# Patient Record
Sex: FEMALE | Race: WHITE | NOT HISPANIC OR LATINO | Employment: OTHER | URBAN - METROPOLITAN AREA
[De-identification: names, ages, dates, MRNs, and addresses within clinical notes are randomized per-mention and may not be internally consistent; named-entity substitution may affect disease eponyms.]

---

## 2017-05-17 ENCOUNTER — IMPORTED ENCOUNTER (OUTPATIENT)
Dept: URBAN - METROPOLITAN AREA CLINIC 9 | Facility: CLINIC | Age: 72
End: 2017-05-17

## 2017-11-15 ENCOUNTER — IMPORTED ENCOUNTER (OUTPATIENT)
Dept: URBAN - METROPOLITAN AREA CLINIC 9 | Facility: CLINIC | Age: 72
End: 2017-11-15

## 2018-05-16 ENCOUNTER — IMPORTED ENCOUNTER (OUTPATIENT)
Dept: URBAN - METROPOLITAN AREA CLINIC 9 | Facility: CLINIC | Age: 73
End: 2018-05-16

## 2018-11-15 ENCOUNTER — IMPORTED ENCOUNTER (OUTPATIENT)
Dept: URBAN - METROPOLITAN AREA CLINIC 9 | Facility: CLINIC | Age: 73
End: 2018-11-15

## 2018-12-19 ENCOUNTER — IMPORTED ENCOUNTER (OUTPATIENT)
Dept: URBAN - METROPOLITAN AREA CLINIC 9 | Facility: CLINIC | Age: 73
End: 2018-12-19

## 2019-01-16 ENCOUNTER — IMPORTED ENCOUNTER (OUTPATIENT)
Dept: URBAN - METROPOLITAN AREA CLINIC 9 | Facility: CLINIC | Age: 74
End: 2019-01-16

## 2019-02-13 ENCOUNTER — IMPORTED ENCOUNTER (OUTPATIENT)
Dept: URBAN - METROPOLITAN AREA CLINIC 9 | Facility: CLINIC | Age: 74
End: 2019-02-13

## 2019-05-22 ENCOUNTER — IMPORTED ENCOUNTER (OUTPATIENT)
Dept: URBAN - METROPOLITAN AREA CLINIC 9 | Facility: CLINIC | Age: 74
End: 2019-05-22

## 2019-11-14 ENCOUNTER — IMPORTED ENCOUNTER (OUTPATIENT)
Dept: URBAN - METROPOLITAN AREA CLINIC 9 | Facility: CLINIC | Age: 74
End: 2019-11-14

## 2020-02-26 NOTE — PATIENT DISCUSSION
Discussed option of trying EZ Tears supplements. Handout given and supplement discussed. Advised if would like to try EZ Tears that Pt should avoid doubling up on supplements in EZ Tears. Advised takes about 3-4 weeks before Pt will notice improvement.

## 2020-02-26 NOTE — PATIENT DISCUSSION
Pt reports has tried Restasis w/out improvement. Pt states tried Prednisolone and it improved symptoms, but advised not a long term solution.

## 2020-02-26 NOTE — PATIENT DISCUSSION
Continue Systane Gel drops and increase prn. Recommended Gel in a tube qhs. Systane PM Gel coupon given today.

## 2020-05-13 ENCOUNTER — IMPORTED ENCOUNTER (OUTPATIENT)
Dept: URBAN - METROPOLITAN AREA CLINIC 9 | Facility: CLINIC | Age: 75
End: 2020-05-13

## 2020-06-04 NOTE — PATIENT DISCUSSION
Start Erythromycin bartolo qhs OU for [address] of PM Gel. Advised will cause blurred vision. After 1 week can change back to PM Gel.

## 2020-11-18 ENCOUNTER — IMPORTED ENCOUNTER (OUTPATIENT)
Dept: URBAN - METROPOLITAN AREA CLINIC 9 | Facility: CLINIC | Age: 75
End: 2020-11-18

## 2020-12-08 NOTE — PATIENT DISCUSSION
Pt reports has tried Restasis in past w/out improvement. Pt states tried Prednisolone and it improved symptoms.

## 2020-12-08 NOTE — PATIENT DISCUSSION
Pt reports using FML really improves symptoms and prefers to continue to use prn, but having trouble with insurance coverage.

## 2020-12-08 NOTE — PATIENT DISCUSSION
Okay to Continue FML bid OU prn. Pt aware to use very sparingly prn. Will attempt insurance PA for Pt.

## 2020-12-08 NOTE — PATIENT DISCUSSION
"Pt reports using ATs throughout the day, provides minimal improvement, ""never really goes away with just artificial tears"". "

## 2021-05-05 ENCOUNTER — IMPORTED ENCOUNTER (OUTPATIENT)
Dept: URBAN - METROPOLITAN AREA CLINIC 9 | Facility: CLINIC | Age: 76
End: 2021-05-05

## 2021-08-05 ENCOUNTER — IMPORTED ENCOUNTER (OUTPATIENT)
Dept: URBAN - METROPOLITAN AREA CLINIC 9 | Facility: CLINIC | Age: 76
End: 2021-08-05

## 2021-10-17 ASSESSMENT — VISUAL ACUITY
OS_CC: 20/30 -2 SN
OD_SC: 20/25 -2 SN
OS_SC: 20/30 -2 SN
OS_SC: 20/40 SN
OS_SC: 20/60 SN
OD_SC: 20/30 SN
OD_SC: 20/25 -2 SN
OS_SC: 20/30 -2 SN
OS_PH: 20/40 + SN
OD_SC: 20/25 - SN
OD_SC: 20/25 -2 SN
OS_SC: 20/30 - SN
OS_CC: 20/30 +2 SN
OS_SC: 20/60 - SN
OS_SC: 20/60 SN
OD_CC: 20/25 + SN
OS_PH: 20/40 SN
OD_SC: 20/20 SN
OS_PH: 20/50 SN
OS_SC: 20/50 - SN
OD_CC: 20/25 SN
OS_PH: 20/30 -2 SN
OS_SC: 20/60 - SN
OS_SC: 20/30 +2 SN
OD_SC: 20/20 - SN
OS_SC: 20/40 SN
OD_SC: 20/25 SN
OD_SC: 20/25 SN
OS_SC: 20/50 -2 SN
OD_CC: 20/20 - SN
OD_SC: 20/25 -2 SN
OD_CC: 20/25 SN
OS_CC: 20/40 - SN
OD_SC: 20/25 SN
OD_SC: 20/30 SN
OD_SC: 20/30 - SN
OS_SC: 20/30 -2 SN
OS_CC: 20/30 -2 SN

## 2021-10-17 ASSESSMENT — TONOMETRY
OD_IOP_MMHG: 19
OS_IOP_MMHG: 19
OD_IOP_MMHG: 19
OS_IOP_MMHG: 17
OS_IOP_MMHG: 12
OD_IOP_MMHG: 17
OS_IOP_MMHG: 18
OD_IOP_MMHG: 32
OD_IOP_MMHG: 12
OD_IOP_MMHG: 22
OS_IOP_MMHG: 19
OD_IOP_MMHG: 14
OS_IOP_MMHG: 19
OS_IOP_MMHG: 19
OD_IOP_MMHG: 17
OD_IOP_MMHG: 19
OS_IOP_MMHG: 18
OD_IOP_MMHG: 17
OS_IOP_MMHG: 18
OS_IOP_MMHG: 12
OD_IOP_MMHG: 20
OS_IOP_MMHG: 20
OS_IOP_MMHG: 16
OS_IOP_MMHG: 13
OD_IOP_MMHG: 13
OD_IOP_MMHG: 19

## 2021-10-17 ASSESSMENT — KERATOMETRY
OS_AXISANGLE2_DEGREES: 20
OD_AXISANGLE2_DEGREES: 132
OD_AXISANGLE_DEGREES: 180
OS_K1POWER_DIOPTERS: 46.25
OD_AXISANGLE_DEGREES: 42
OD_AXISANGLE_DEGREES: 24
OD_AXISANGLE2_DEGREES: 114
OS_K1POWER_DIOPTERS: 46
OS_K1POWER_DIOPTERS: 46.25
OD_AXISANGLE2_DEGREES: 90
OS_K2POWER_DIOPTERS: 46.5
OD_K1POWER_DIOPTERS: 46
OS_AXISANGLE_DEGREES: 177
OD_AXISANGLE2_DEGREES: 109
OS_AXISANGLE_DEGREES: 36
OS_AXISANGLE2_DEGREES: 77
OD_K1POWER_DIOPTERS: 46
OS_K2POWER_DIOPTERS: 45.25
OS_AXISANGLE_DEGREES: 110
OD_K1POWER_DIOPTERS: 45.5
OS_K1POWER_DIOPTERS: 46.5
OD_K2POWER_DIOPTERS: 46.25
OS_AXISANGLE2_DEGREES: 126
OS_K2POWER_DIOPTERS: 46.5
OS_AXISANGLE2_DEGREES: 87
OD_K2POWER_DIOPTERS: 47
OD_AXISANGLE_DEGREES: 19
OS_AXISANGLE_DEGREES: 167
OD_K2POWER_DIOPTERS: 46.25
OD_K1POWER_DIOPTERS: 46.25
OS_K2POWER_DIOPTERS: 46.5
OD_K2POWER_DIOPTERS: 46

## 2021-11-29 ENCOUNTER — ESTABLISHED PATIENT (OUTPATIENT)
Dept: URBAN - METROPOLITAN AREA CLINIC 9 | Facility: CLINIC | Age: 76
End: 2021-11-29

## 2021-11-29 DIAGNOSIS — H52.12: ICD-10-CM

## 2021-11-29 DIAGNOSIS — H26.493: ICD-10-CM

## 2021-11-29 DIAGNOSIS — H40.053: ICD-10-CM

## 2021-11-29 DIAGNOSIS — H53.032: ICD-10-CM

## 2021-11-29 PROCEDURE — 92133 CPTRZD OPH DX IMG PST SGM ON: CPT

## 2021-11-29 PROCEDURE — 92015 DETERMINE REFRACTIVE STATE: CPT

## 2021-11-29 PROCEDURE — 92014 COMPRE OPH EXAM EST PT 1/>: CPT

## 2021-11-29 ASSESSMENT — KERATOMETRY
OD_AXISANGLE_DEGREES: 31
OD_K2POWER_DIOPTERS: 46.50
OS_K1POWER_DIOPTERS: 46.25
OS_K2POWER_DIOPTERS: 46.25
OD_AXISANGLE2_DEGREES: 121
OS_AXISANGLE2_DEGREES: 90
OS_AXISANGLE_DEGREES: 0
OD_K1POWER_DIOPTERS: 46.00

## 2021-11-29 ASSESSMENT — TONOMETRY
OD_IOP_MMHG: 18
OS_IOP_MMHG: 26

## 2021-11-29 ASSESSMENT — VISUAL ACUITY
OD_GLARE: 20/30-1
OU_SC: 20/25-1
OS_GLARE: 20/40
OS_SC: 20/40+2
OD_SC: 20/25

## 2021-12-09 NOTE — PATIENT DISCUSSION
Discussed option of trying Xiidra bid OU. Explained Flakito Desai does not work for everyone and takes at least 2 weeks of use before Pt will notice if improves symptoms. Advised will still need to use ATs while using Flakito Desai. Sample given. Okay to send E-Rx if Pt likes and calls.

## 2022-02-28 ENCOUNTER — ESTABLISHED PATIENT (OUTPATIENT)
Dept: URBAN - METROPOLITAN AREA CLINIC 9 | Facility: CLINIC | Age: 77
End: 2022-02-28

## 2022-02-28 DIAGNOSIS — H26.493: ICD-10-CM

## 2022-02-28 DIAGNOSIS — H40.053: ICD-10-CM

## 2022-02-28 PROCEDURE — 99213 OFFICE O/P EST LOW 20 MIN: CPT

## 2022-02-28 ASSESSMENT — TONOMETRY
OD_IOP_MMHG: 20
OS_IOP_MMHG: 20

## 2022-02-28 ASSESSMENT — VISUAL ACUITY
OD_SC: 20/25
OS_SC: 20/50+1

## 2022-05-04 ENCOUNTER — ESTABLISHED PATIENT (OUTPATIENT)
Dept: URBAN - METROPOLITAN AREA CLINIC 4 | Facility: CLINIC | Age: 77
End: 2022-05-04

## 2022-05-04 DIAGNOSIS — H35.361: ICD-10-CM

## 2022-05-04 DIAGNOSIS — H40.053: ICD-10-CM

## 2022-05-04 DIAGNOSIS — D31.32: ICD-10-CM

## 2022-05-04 DIAGNOSIS — H35.371: ICD-10-CM

## 2022-05-04 PROCEDURE — 92134 CPTRZ OPH DX IMG PST SGM RTA: CPT

## 2022-05-04 PROCEDURE — 92014 COMPRE OPH EXAM EST PT 1/>: CPT

## 2022-05-04 ASSESSMENT — TONOMETRY
OS_IOP_MMHG: 17
OD_IOP_MMHG: 18

## 2022-05-04 ASSESSMENT — VISUAL ACUITY
OD_SC: 20/25+2
OS_SC: 20/40+2

## 2022-11-14 ENCOUNTER — ESTABLISHED PATIENT (OUTPATIENT)
Dept: URBAN - METROPOLITAN AREA CLINIC 9 | Facility: CLINIC | Age: 77
End: 2022-11-14

## 2022-11-14 DIAGNOSIS — H43.393: ICD-10-CM

## 2022-11-14 DIAGNOSIS — H40.053: ICD-10-CM

## 2022-11-14 DIAGNOSIS — H26.493: ICD-10-CM

## 2022-11-14 DIAGNOSIS — H35.361: ICD-10-CM

## 2022-11-14 DIAGNOSIS — H35.371: ICD-10-CM

## 2022-11-14 DIAGNOSIS — H52.221: ICD-10-CM

## 2022-11-14 PROCEDURE — 92014 COMPRE OPH EXAM EST PT 1/>: CPT

## 2022-11-14 PROCEDURE — 92015 DETERMINE REFRACTIVE STATE: CPT

## 2022-11-14 ASSESSMENT — KERATOMETRY
OD_K2POWER_DIOPTERS: 46.50
OD_K1POWER_DIOPTERS: 45.75
OD_AXISANGLE2_DEGREES: 115
OS_K2POWER_DIOPTERS: 46.25
OS_AXISANGLE_DEGREES: 103
OS_K1POWER_DIOPTERS: 45.75
OS_AXISANGLE2_DEGREES: 13
OD_AXISANGLE_DEGREES: 25

## 2022-11-14 ASSESSMENT — TONOMETRY
OD_IOP_MMHG: 14
OS_IOP_MMHG: 14

## 2022-11-14 ASSESSMENT — VISUAL ACUITY
OU_SC: 20/25+2
OS_SC: 20/30
OD_SC: 20/20-1

## 2023-05-15 ENCOUNTER — ESTABLISHED PATIENT (OUTPATIENT)
Dept: URBAN - METROPOLITAN AREA CLINIC 4 | Facility: CLINIC | Age: 78
End: 2023-05-15

## 2023-05-15 DIAGNOSIS — H35.371: ICD-10-CM

## 2023-05-15 DIAGNOSIS — H43.393: ICD-10-CM

## 2023-05-15 DIAGNOSIS — D31.32: ICD-10-CM

## 2023-05-15 DIAGNOSIS — H40.053: ICD-10-CM

## 2023-05-15 DIAGNOSIS — H35.361: ICD-10-CM

## 2023-05-15 PROCEDURE — 92250 FUNDUS PHOTOGRAPHY W/I&R: CPT

## 2023-05-15 PROCEDURE — 92134 CPTRZ OPH DX IMG PST SGM RTA: CPT

## 2023-05-15 PROCEDURE — 92014 COMPRE OPH EXAM EST PT 1/>: CPT

## 2023-05-15 ASSESSMENT — TONOMETRY
OS_IOP_MMHG: 14
OD_IOP_MMHG: 13

## 2023-05-15 ASSESSMENT — VISUAL ACUITY
OS_SC: 20/30-2
OD_SC: 20/25-1

## 2024-05-17 ENCOUNTER — ESTABLISHED PATIENT (OUTPATIENT)
Dept: URBAN - METROPOLITAN AREA CLINIC 11 | Facility: CLINIC | Age: 79
End: 2024-05-17

## 2024-05-17 DIAGNOSIS — H33.191: ICD-10-CM

## 2024-05-17 DIAGNOSIS — H43.821: ICD-10-CM

## 2024-05-17 DIAGNOSIS — H43.812: ICD-10-CM

## 2024-05-17 DIAGNOSIS — H35.40: ICD-10-CM

## 2024-05-17 PROCEDURE — 92014 COMPRE OPH EXAM EST PT 1/>: CPT

## 2024-05-17 PROCEDURE — 92201 OPSCPY EXTND RTA DRAW UNI/BI: CPT

## 2024-05-17 PROCEDURE — 92134 CPTRZ OPH DX IMG PST SGM RTA: CPT

## 2024-05-17 ASSESSMENT — VISUAL ACUITY
OS_SC: 20/40-1
OD_SC: 20/25+2

## 2024-05-17 ASSESSMENT — TONOMETRY
OS_IOP_MMHG: 17
OD_IOP_MMHG: 15

## 2025-01-27 ENCOUNTER — COMPREHENSIVE EXAM (OUTPATIENT)
Age: 80
End: 2025-01-27

## 2025-01-27 DIAGNOSIS — H33.191: ICD-10-CM

## 2025-01-27 DIAGNOSIS — H40.053: ICD-10-CM

## 2025-01-27 DIAGNOSIS — H52.221: ICD-10-CM

## 2025-01-27 DIAGNOSIS — H43.812: ICD-10-CM

## 2025-01-27 DIAGNOSIS — H35.40: ICD-10-CM

## 2025-01-27 DIAGNOSIS — H26.493: ICD-10-CM

## 2025-01-27 DIAGNOSIS — H43.821: ICD-10-CM

## 2025-01-27 PROCEDURE — 92133 CPTRZD OPH DX IMG PST SGM ON: CPT

## 2025-01-27 PROCEDURE — 92014 COMPRE OPH EXAM EST PT 1/>: CPT

## 2025-01-27 PROCEDURE — 92015 DETERMINE REFRACTIVE STATE: CPT

## 2025-05-13 ENCOUNTER — COMPREHENSIVE EXAM (OUTPATIENT)
Age: 80
End: 2025-05-13

## 2025-05-13 DIAGNOSIS — H35.40: ICD-10-CM

## 2025-05-13 DIAGNOSIS — H43.821: ICD-10-CM

## 2025-05-13 DIAGNOSIS — H43.812: ICD-10-CM

## 2025-05-13 DIAGNOSIS — H33.191: ICD-10-CM

## 2025-05-13 PROCEDURE — 92134 CPTRZ OPH DX IMG PST SGM RTA: CPT

## 2025-05-13 PROCEDURE — 92014 COMPRE OPH EXAM EST PT 1/>: CPT

## 2025-05-13 PROCEDURE — 92201 OPSCPY EXTND RTA DRAW UNI/BI: CPT
